# Patient Record
Sex: FEMALE | Race: BLACK OR AFRICAN AMERICAN
[De-identification: names, ages, dates, MRNs, and addresses within clinical notes are randomized per-mention and may not be internally consistent; named-entity substitution may affect disease eponyms.]

---

## 2020-10-13 ENCOUNTER — HOSPITAL ENCOUNTER (EMERGENCY)
Dept: HOSPITAL 97 - ER | Age: 45
Discharge: HOME | End: 2020-10-13
Payer: COMMERCIAL

## 2020-10-13 VITALS — TEMPERATURE: 98.1 F | SYSTOLIC BLOOD PRESSURE: 106 MMHG | DIASTOLIC BLOOD PRESSURE: 75 MMHG | OXYGEN SATURATION: 100 %

## 2020-10-13 DIAGNOSIS — Z88.6: ICD-10-CM

## 2020-10-13 DIAGNOSIS — M79.662: ICD-10-CM

## 2020-10-13 DIAGNOSIS — Z88.0: ICD-10-CM

## 2020-10-13 DIAGNOSIS — M25.561: Primary | ICD-10-CM

## 2020-10-13 DIAGNOSIS — I10: ICD-10-CM

## 2020-10-13 PROCEDURE — 93970 EXTREMITY STUDY: CPT

## 2020-10-13 PROCEDURE — 99283 EMERGENCY DEPT VISIT LOW MDM: CPT

## 2020-10-13 NOTE — EDPHYS
Physician Documentation                                                                           

 [facility]                                                                 

Name: Jimmy Mann                                                                              

Age: 45 yrs                                                                                       

Sex: Female                                                                                       

: 1975                                                                                   

MRN: [id number]                                                                                   

Arrival Date: 10/13/2020                                                                          

Time: 14:52                                                                                       

Account#: [id number]                                                                            

Bed 24                                                                                            

Private MD:                                                                                       

ED Physician Diaz Roberts                                                                         

HPI:                                                                                              

10/13                                                                                             

16:13 This 45 yrs old Black Female presents to ER via Ambulatory with complaints of Right Leg pm1 

      Swelling.                                                                                   

16:13 The patient presents with On and off swelling and pain to right leg around her right    pm1 

      knee. Onset: The symptoms/episode began/occurred 4 day(s) ago. Modifying factors: The       

      symptoms are alleviated by nothing. the symptoms are aggravated by weight bearing,          

      bending knee. Associated signs and symptoms: Pertinent positives: swelling, Pertinent       

      negatives calf tenderness, fever, numbness, tingling. Treatment prior to arrival            

      includes: no previous treatment. Severity of symptoms: in the emergency department the      

      symptoms have improved. The patient has not experienced similar symptoms in the past.       

      Patient reports left upper leg pain that has been ongoing to years that she attributes      

      to a chondroblastoma. She would like that evaluated also.                                   

                                                                                                  

OB/GYN:                                                                                           

15:03 LMP 2020                                                                           jd3 

                                                                                                  

Historical:                                                                                       

- Allergies:                                                                                      

15:02 Aspirin;                                                                                jd3 

15:02 PENICILLINS;                                                                            jd3 

- Home Meds:                                                                                      

15:02 lisinopril-hydrochlorothiazide oral oral [Active];                                      jd3 

- PMHx:                                                                                           

15:02 Hypertension;                                                                           jd3 

- PSHx:                                                                                           

15:02 ;                                                                              jd3 

                                                                                                  

- Immunization history:: Adult Immunizations up to date.                                          

- Social history:: Smoking status: Patient denies any tobacco usage or history of.                

                                                                                                  

                                                                                                  

ROS:                                                                                              

16:17 Constitutional: Negative for fever, chills, and weight loss, Cardiovascular: Negative   pm1 

      for chest pain, palpitations, and edema, Respiratory: Negative for shortness of breath,     

      cough, wheezing, and pleuritic chest pain, Abdomen/GI: Negative for abdominal pain,         

      nausea, vomiting, diarrhea, and constipation, Back: Negative for injury and pain.           

16:17 Skin: Negative for injury, rash, and discoloration, Neuro: Negative for headache,           

      weakness, numbness, tingling, and seizure.                                                  

16:17 MS/extremity: Positive for pain, swelling, tenderness, of the distarl right hamstring,      

      posterior aspect of right knee, proximal medial aspect of right calf, distal right          

      quadriceps and proximal right shin, Negative for injury or acute deformity, decreased       

      range of motion.                                                                            

                                                                                                  

Exam:                                                                                             

16:17 Constitutional:  This is a well developed, well nourished patient who is awake, alert,  pm1 

      and in no acute distress. Head/Face:  Normocephalic, atraumatic.                            

16:17 Skin:  Warm, dry with normal turgor.  Normal color with no rashes, no lesions, and no       

      evidence of cellulitis. MS/ Extremity:  Pulses equal, no cyanosis.  Neurovascular           

      intact.  Full, normal range of motion.                                                      

16:17 Cardiovascular: Exam negative for  acute changes, Rate: normal, Rhythm: regular,            

      Pulses: no pulse deficits are appreciated, Pulses are 2+ in right posterior tibial          

      artery and right dorsalis pedis artery. Edema: pedal edema, that is very mild, right        

      leg.                                                                                        

16:17 Respiratory: Exam negative for  acute changes, respiratory distress, shortness of           

      breath.                                                                                     

16:17 Neuro: Exam negative for acute changes, Orientation: is normal, Mentation: is normal,       

      Motor: is normal, moves all fours.                                                          

                                                                                                  

Vital Signs:                                                                                      

15:03  / 75; Pulse 87; Resp 17 S; Temp 98.1(O); Pulse Ox 100% on R/A; Weight 102.06 kg  jd3 

      (R); Height 5 ft. 8 in. (172.72 cm) (R); Pain 8/10;                                         

15:03 Body Mass Index 34.21 (102.06 kg, 172.72 cm)                                            jd3 

                                                                                                  

MDM:                                                                                              

16:05 Patient medically screened.                                                             pm1 

16:20 Data reviewed: vital signs. Data interpreted: Pulse oximetry: on room air is 100 %.     pm1 

      Interpretation: normal.                                                                     

17:48 Counseling: I had a detailed discussion with the patient and/or guardian regarding: the pm1 

      historical points, exam findings, and any diagnostic results supporting the                 

      discharge/admit diagnosis, radiology results, the need for outpatient follow up, to         

      return to the emergency department if symptoms worsen or persist or if there are any        

      questions or concerns that arise at home.                                                   

                                                                                                  

10/13                                                                                             

16:17 Order name: Knee Right 3 View XRAY; Complete Time: 17:34                                pm1 

10/13                                                                                             

16:17 Order name: Femur Left XRAY; Complete Time: 17:34                                       pm1 

10/13                                                                                             

17:12 Order name: Extrem Venous W Compress Herb; Complete Time: 17:32                          EDMS

                                                                                                  

Administered Medications:                                                                         

16:43 Drug: Norco 10 mg-325 mg 1 tabs Route: PO;                                                

17:30 Follow up: Response: No adverse reaction; Pain is decreased                               

                                                                                                  

                                                                                                  

Disposition:                                                                                      

18:30 Co-signature as Attending Physician, Diaz Roberts MD.                                    rn  

                                                                                                  

Disposition:                                                                                      

10/13/20 17:49 Discharged to Home. Impression: Pain in right knee, Pain in left leg.              

- Condition is Stable.                                                                            

- Discharge Instructions: Musculoskeletal Pain, Knee Pain.                                        

- Prescriptions for Tramadol 50 mg Oral Tablet - take 1 tablet by ORAL route every 8              

  hours as needed; 12 tablet.                                                                     

- Medication Reconciliation Form, Thank You Letter, Antibiotic Education, Prescription            

  Opioid Use form.                                                                                

- Follow up: Emergency Department; When: As needed; Reason: Worsening of condition.               

  Follow up: Private Physician; When: 2 - 3 days; Reason: Recheck today's complaints,             

  Continuance of care, Re-evaluation by your physician.                                           

- Problem is new.                                                                                 

- Symptoms have improved.                                                                         

                                                                                                  

                                                                                                  

                                                                                                  

Signatures:                                                                                       

Dispatcher [employer]                           EDMS                                                 

Dilcia Burgos RN RN iw Nieto, Roman, MD MD rn Marinas, Patrick, NP                    NP   pm1                                                  

Jose Eduardo Li RN                    RN   jd3                                                  

                                                                                                  

Corrections: (The following items were deleted from the chart)                                    

17:12 15:46 Extremity Venous [id number]BRZ ordered. [facility]

17:57 17:49 10/13/2020 17:49 Discharged to Home. Impression: Pain in right knee; Pain in left iw  

      leg. Condition is Stable. Forms are Medication Reconciliation Form, Thank You Letter,       

      Antibiotic Education, Prescription Opioid Use. Follow up: Emergency Department; When:       

      As needed; Reason: Worsening of condition. Follow up: Private Physician; When: 2 - 3        

      days; Reason: Recheck today's complaints, Continuance of care, Re-evaluation by your        

      physician. Problem is new. Symptoms have improved. pm1                                      

                                                                                                  

**************************************************************************************************

## 2020-10-13 NOTE — ER
Nurse's Notes                                                                                     

 [facility]                                                                 

Name: Jimmy Mann                                                                              

Age: 45 yrs                                                                                       

Sex: Female                                                                                       

: 1975                                                                                   

MRN: [id number]                                                                                   

Arrival Date: 10/13/2020                                                                          

Time: 14:52                                                                                       

Account#: [id number]                                                                            

Bed 24                                                                                            

Private MD:                                                                                       

Diagnosis: Pain in right knee;Pain in left leg                                                    

                                                                                                  

Presentation:                                                                                     

10/13                                                                                             

15:00 Chief complaint: Patient states: "Off and on my right leg has been swelling. my doctor  jd3 

      wanted me to come in and make sure it was not a clot.". Coronavirus screen: At this         

      time, the client does not indicate any symptoms associated with coronavirus-19. Ebola       

      Screen: Patient negative for fever greater than or equal to 101.5 degrees Fahrenheit,       

      and additional compatible Ebola Virus Disease symptoms. Initial Sepsis Screen: Does the     

      patient meet any 2 criteria? No. Patient's initial sepsis screen is negative. Does the      

      patient have a suspected source of infection? No. Patient's initial sepsis screen is        

      negative. Risk Assessment: Do you want to hurt yourself or someone else? Patient            

      reports no desire to harm self or others. Onset of symptoms was 2020.           

15:00 Method Of Arrival: Ambulatory                                                           jd3 

15:00 Acuity: STEVE 4                                                                           jd3 

                                                                                                  

Triage Assessment:                                                                                

17:55 General: Appears in no apparent distress. Behavior is calm, cooperative.                iw  

                                                                                                  

OB/GYN:                                                                                           

15:03 LMP 2020                                                                           jd3 

                                                                                                  

Historical:                                                                                       

- Allergies:                                                                                      

15:02 Aspirin;                                                                                jd3 

15:02 PENICILLINS;                                                                            jd3 

- Home Meds:                                                                                      

15:02 lisinopril-hydrochlorothiazide oral oral [Active];                                      jd3 

- PMHx:                                                                                           

15:02 Hypertension;                                                                           jd3 

- PSHx:                                                                                           

15:02 ;                                                                              jd3 

                                                                                                  

- Immunization history:: Adult Immunizations up to date.                                          

- Social history:: Smoking status: Patient denies any tobacco usage or history of.                

                                                                                                  

                                                                                                  

Screenin:57 Abuse screen: Denies threats or abuse. Denies injuries from another. Nutritional        iw  

      screening: No deficits noted. Tuberculosis screening: No symptoms or risk factors           

      identified. Fall Risk None identified.                                                      

                                                                                                  

Assessment:                                                                                       

17:00 General: Appears in no apparent distress. Behavior is calm, cooperative. Pain:          iw  

      Complains of pain in right knee. Neuro: Level of Consciousness is awake, alert, obeys       

      commands, Oriented to person, place, time, situation. Cardiovascular: Patient's skin is     

      warm and dry. Respiratory: Respiratory effort is even, unlabored, Respiratory pattern       

      is regular. Musculoskeletal: Range of motion: intact in all extremities, Reports pain       

      in right knee.                                                                              

17:56 Reassessment: Patient appears in no apparent distress at this time. Patient and/or      iw  

      family updated on plan of care and expected duration. Pain level reassessed. Patient is     

      alert, oriented x 3, equal unlabored respirations, skin warm/dry/pink. Patient states       

      feeling better. Patient states symptoms have improved.                                      

                                                                                                  

Vital Signs:                                                                                      

15:03  / 75; Pulse 87; Resp 17 S; Temp 98.1(O); Pulse Ox 100% on R/A; Weight 102.06 kg  jd3 

      (R); Height 5 ft. 8 in. (172.72 cm) (R); Pain 8/10;                                         

15:03 Body Mass Index 34.21 (102.06 kg, 172.72 cm)                                            jd3 

                                                                                                  

ED Course:                                                                                        

14:52 Patient arrived in ED.                                                                  mr  

15:02 Triage completed.                                                                       jd3 

15:05 Arm band placed on.                                                                     jd3 

15:58 Dilcia Burgos, RN is Primary Nurse.                                                   iw  

16:05 Omar Arndt, NP is PHCP.                                                           pm1 

16:05 Diaz Roberts MD is Attending Physician.                                                pm1 

17:00 Patient has correct armband on for positive identification.                             iw  

17:10 Knee Right 3 View XRAY In Process Unspecified.                                          EDMS

17:10 Femur Left XRAY In Process Unspecified.                                                 EDMS

17:13 Extrem Venous W Compress Herb In Process Unspecified.                                    EDMS

17:56 No provider procedures requiring assistance completed. Patient did not have IV access   iw  

      during this emergency room visit.                                                           

                                                                                                  

Administered Medications:                                                                         

16:43 Drug: Norco 10 mg-325 mg 1 tabs Route: PO;                                              iw  

17:30 Follow up: Response: No adverse reaction; Pain is decreased                             iw  

                                                                                                  

                                                                                                  

Outcome:                                                                                          

17:49 Discharge ordered by MD.                                                                pm1 

17:56 Discharged to home ambulatory.                                                          iw  

17:56 Condition: good                                                                             

17:56 Discharge instructions given to patient, Instructed on discharge instructions, follow       

      up and referral plans. medication usage, Demonstrated understanding of instructions,        

      follow-up care, medications, Prescriptions given X 1.                                       

17:57 Patient left the ED.                                                                    iw  

                                                                                                  

Signatures:                                                                                       

Dispatcher [employer]                           [facility]                                                 

Brittanie Mcelroy                                 mr                                                   

Dilcia Burgos, RN                     RN   iw                                                   

Omar Arndt NP                    NP   pm1                                                  

Jose Eduardo Li RN                    RN   jd3                                                  

                                                                                                  

Corrections: (The following items were deleted from the chart)                                    

15:05 15:03 Pulse 87bpm; Resp 17bpm; Spontaneous; Pulse Ox 100% RA; Temp 98.1F Oral; 102.06   jd3 

      kg Reported; Height 5 ft. 8 in. Reported; BMI: 34.2; Pain 8/10; jd3                         

15:06 15:03 Pulse 87bpm; Resp 17bpm; Spontaneous; Pulse Ox 100% RA; Temp 98.1F Oral; 102.06   jd3 

      kg Reported; Height 5 ft. 8 in. Reported; BMI: 34.2; Pain 8/10; jd3                         

17:12 17:08 In radiology for Extremity Venous [email or web address]. EDMS                        EDMS

                                                                                                  

**************************************************************************************************

## 2020-10-13 NOTE — RAD REPORT
EXAM DESCRIPTION:  RAD - Femur Left - 10/13/2020 5:11 pm

 

CLINICAL HISTORY:  PAIN

 

COMPARISON:  None.

 

FINDINGS:  No fracture is identified.  There is no dislocation or periosteal reaction noted. No AVN o
r focal femoral head abnormality. No joint effusion seen at the hip. . No air or foreign body in the 
soft tissues. No periarticular abnormality.

 

 

IMPRESSION:  Negative left femur examination.

## 2020-10-13 NOTE — XMS REPORT
Continuity of Care Document

                           Created on:2020



Patient:ANGE ABARCA

Sex:Female

:1975

External Reference #:[id number]





Demographics







                          Address                   [address] [place]

[place]



                                                    [place] 



                                                    [place] [address]

 

                          Home Phone                [phone]

 

                          Email Address             DECLINE 10/13/20

 

                          Preferred Language        English

 

                          Marital Status            Unknown

 

                          Rastafari Affiliation     Unknown

 

                          Race                      Unknown

 

                          Additional Race(s)        Unavailable

 

                          Ethnic Group              Unknown









Author







                          Organization              [facility]

t

 

                          Address                   [address]



                                                    [place] [address]

 

                          Phone                     [phone]









Care Team Providers







                    Name                Role                Phone

 

                    Unavailable         Unavailable         Unavailable









Problems







       Condition Condition Condition Status Onset  Resolution Last   Treating Co

mments 

Source



       Name   Details Category        Date   Date   Treatment Clinician        



                                                 Date                 

 

       Mixed  Mixed  Diagnosis Active                                    [facility]



       hyperlipid hyperlipid                                                  [facility]

[facility] -



       emia   emia                                                    [facility]



                                                                      l



                                                                      Outpati



                                                                      ent



                                                                      Clinics

 

       HTN    HTN    Diagnosis Active                                    [facility]



       (hypertens (hypertens                                                  [facility]

[facility] -



       ion),  ion),                                                   [facility]



       benign benign                                                  l



                                                                      Outpati



                                                                      ent



                                                                      Clinics

 

       Depression Depression Diagnosis Active                                   

 [facility]



       with   with                                                    [facility]



       anxiety anxiety                                                  [facility]



                                                                      l



                                                                      Outpati



                                                                      ent



                                                                      Clinics

 

       Bone   Bone   Diagnosis Active                                    [facility]



       lesion lesion                                                  [facility]



                                                                      [facility]



                                                                      l



                                                                      Outpati



                                                                      ent



                                                                      Clinics

 

       Amenorrhea Amenorrhea Problem Active                                    [facility]

[facility]



                                                                      [facility]



                                                                      [facility]



                                                                      l



                                                                      Outpati



                                                                      ent



                                                                      Clinics

 

       Menopausal Menopausal Problem Active                                    [facility]

[facility]



       disorder disorder                                                  [facility] 

-



                                                                      [facility]



                                                                      l



                                                                      Outpati



                                                                      ent



                                                                      Clinics

 

       Seasonal Seasonal Diagnosis Active                                    [facility]



       allergic allergic                                                  [facility] 

-



       rhinitis, rhinitis,                                                  [facility]

[facility]



       unspecifie unspecifie                                                  l



       d trigger d trigger                                                  Outp

ati



                                                                      ent



                                                                      Clinics

 

       Encounter Encounter Problem Active                                    [facility]



       for    for                                                     [facility]



       gynecologi gynecologi                                                  Me

moria



       drea    drea                                                     l



       examinatio examinatio                                                  Ou

tpati



       n without n without                                                  ent



       abnormal abnormal                                                  Clinic

s



       finding finding                                                  

 

       BMI    BMI    Problem Active                                    [facility]



       33.0-33.9, 33.0-33.9,                                                  [facility]

[facility] -



       adult  adult                                                   [facility]



                                                                      l



                                                                      Outpati



                                                                      ent



                                                                      Clinics

 

       Encounter Encounter Problem Active                                    [facility]



       for    for                                                     [facility]



       screening screening                                                  [facility]

katja



       mammogram mammogram                                                  l



       for breast for breast                                                  Ou

tpati



       cancer cancer                                                  ent



                                                                      Clinics

 

       Osteoarthr Osteoarthr Diagnosis Active                                   

 [facility]



       itis,  itis,                                                   [facility]



       unspecifie unspecifie                                                  Me

moria



       d      d                                                       l



       osteoarthr osteoarthr                                                  Ou

tpati



       itis type, itis type,                                                  en

t



       unspecifie unspecifie                                                  Cl

inics



       d site d site                                                  

 

       Prediabete Prediabete Diagnosis Active                                   

 [facility]



       s      s                                                       [facility]



                                                                      [facility]



                                                                      [facility]

 

       GERD   GERD   Problem Active                                    [facility]



       without without                                                  [facility]



       esophagiti esophagiti                                                  Me

moria



       s      s                                                       l



                                                                      [facility]







Allergies, Adverse Reactions, Alerts







       Allergy Allergy Status Severity Reaction(s) Onset  Inactive Treating Comm

ents 

Source



       Name   Type                        Date   Date   Clinician        

 

       penicill Adverse Active        Info Not                             [facility]

t



       in     Reaction               Available                             [facility]

 

       Aspirin Adverse Active        Info Not                             [facility]



              Reaction               Available                             [facility]







Medications







       Ordered Filled Start  Stop   Current Ordering Indication Dosage Frequency

 Signature

                    Comments            Components          Source



     Medication Medication Date Date Medication? Clinician                (SIG) 

          



     Name Name                                                   

 

     Duexis Duexis       Yes  Johnson                1 tablet           [facility]



               4-22           Kelly                               [facility]



               00:00:                                              [facility]



               00                                                [facility]

 

     Lisinopril- Lisinopril-           Yes  Johnson                1 tablet      

     [facility]



     Hydrochloro Hydrochloro                Kelly                               

[facility]



     thiazide thiazide                                                   [facility]

 

     Lisinopril- Lisinopril-           Yes  Johnson                1 tablet      

     [facility]



     Hydrochloro Hydrochloro                Kelly                               

[facility]



     thiazide thiazide                                                   [facility]







Immunizations







           Ordered    Filled Immunization Date       Status     Comments   Sourc

e



           Immunization Name Name                                        

 

           Afluria single dose Afluria single dose 2019 Completed         

    [facility]



                                 00:00:00                         [facility]







Procedures

This patient has no known procedures.



Encounters







        Start   End     Encounter Admission Attending Care    Care    Encounter 

Source



        Date/Time Date/Time Type    Type    Clinicians Facility Department ID   

   

 

        2020 Outpatient                 [facility]

[id number] [facility]



        16:15:00 16:15:00                         t [place] [facility]



                                                                        ent



                                                                        [facility]

 

        2020 Outpatient                 [facility]

[id number] [facility]



        10:15:00 10:15:00                         t [place] 



                                                [employer]   [facility]



                                                                        ent



                                                                        [facility]

 

        2020 Outpatient                 [facility]

[id number] [facility]



        16:45:00 16:45:00                         t [place] 



                                                [employer]   [facility]



                                                                        ent



                                                                        [facility]

 

        2019 Outpatient                 [facility]

[id number] [facility]



        16:00:00 16:00:00                         [place]   [place] [employer]         [facility]

s -



                                                Drive   [facility]



                                                Medicine                 Outpati



                                                                        ent



                                                                        Clinics

 

        2019-10-23 2019-10-23 Outpatient                 [facility]

[id number] [facility]



        15:03:00 15:03:00                         t [place]   [place] [facility]

s -



                                                Drive   [facility]



                                                Medicine                 Outpati



                                                                        ent



                                                                        Clinics

 

        2019 Outpatient                 [facility]

[id number] [facility]



        08:36:00 08:36:00                         [facility]         L

[facility] -



                                                Care    Clinic          [facility]



                                                Clinic                  l



                                                                        Outpati



                                                                        ent



                                                                        Clinics

 

        2019 Outpatient                 [facility]

[id number] [facility]



        10:30:00 10:30:00                         t [place]   [place] [facility]

s -



                                                Drive   [facility]



                                                Medicine                 Outpati



                                                                        ent



                                                                        Clinics

 

        2019 Outpatient                 [facility]

[id number] [facility]



        09:30:00 09:30:00                         t [place]   [employer]

s -



                                                Drive   [facility]



                                                Medicine                 Outpati



                                                                        ent



                                                                        Clinics

 

        2018 Outpatient                 [facility]

[id number] [facility]



        14:44:00 14:44:00                         t [facility]         [facility]

s -



                                                Care    Care Clinic         [facility]

katja



                                                Clinic                  l



                                                                        Outpati



                                                                        ent



                                                                        Clinics

 

        2018 Outpatient                 [facility]

[id number] [facility]



        11:00:00 11:00:00                         t [facility]         [facility]

s -



                                                Care    Care Clinic         [facility]

katja



                                                Clinic                  l



                                                                        Outpati



                                                                        ent



                                                                        Clinics

 

        2018 Outpatient                 [facility]

[id number] [facility]



        11:07:00 11:07:00                         t [place]   [place] [facility]

s -



                                                Drive   [facility]



                                                Medicine                 Outpati



                                                                        ent



                                                                        Clinics

 

        2018 Outpatient                 [facility]

[id number] [facility]



        10:45:00 10:45:00                         t [place]   [employer]

s -



                                                Drive   [facility]



                                                Medicine                 Outpati



                                                                        ent



                                                                        Clinics







Results

This patient has no known results.

## 2020-10-13 NOTE — RAD REPORT
EXAM DESCRIPTION:  US - Extrem Venous W Compress Herb - 10/13/2020 5:11 pm

 

CLINICAL HISTORY:  SWELLING

 

COMPARISON:  None.

 

TECHNIQUE:  Real-time sonographic evaluation of the bilateral lower extremity common femoral, superfi
cial femoral, popliteal and posterior tibial veins was performed.

 

FINDINGS:  Normal compressibility, flow augmentation, phasic flow and spontaneous flow are identified
 in the left and right lower extremity common femoral, superficial femoral, popliteal and posterior t
ibial veins. No intraluminal filling defects seen.

 

IMPRESSION:  No DVT in either lower extremity.

## 2020-10-13 NOTE — RAD REPORT
EXAM DESCRIPTION:  RAD - Knee Right 3 View - 10/13/2020 5:10 pm

 

CLINICAL HISTORY:  Pain;Swelling

 

COMPARISON:  No comparisons

 

FINDINGS:  No fracture, dislocation or periosteal reaction.No joint effusion seen. No joint space todd
rowing. No foreign body or other soft tissue abnormality.

 

 

IMPRESSION:  Negative right knee.

 

Clinical concerns for internal derangement or occult bony injury could be further assessed with MR im
aging.